# Patient Record
Sex: FEMALE | Race: WHITE | URBAN - METROPOLITAN AREA
[De-identification: names, ages, dates, MRNs, and addresses within clinical notes are randomized per-mention and may not be internally consistent; named-entity substitution may affect disease eponyms.]

---

## 2021-08-26 ENCOUNTER — TELEPHONE (OUTPATIENT)
Dept: OTHER | Age: 61
End: 2021-08-26

## 2021-08-26 NOTE — TELEPHONE ENCOUNTER
Pt name and  verified. Pt sister, Jarrod Danielle is calling as they would like to see if it would be possible to establish Pt with the practice. Pt has a lot of breathing issues currently due to living with [facility]. Will be moving up here with Jarrod Danielle and would like to become a Pt of the practice. Advised there is a wait list for the practice and they would have to reach out to discuss. Isatuyogi Shashi is looking to discuss this if possible. Was not interested in other accepting locations. Please advise.   [phone] (home)     Gavino Parisi

## 2021-08-26 NOTE — TELEPHONE ENCOUNTER
Call to pt's sister, pt is experiencing breathing issues and gasping for air due to high toxicity exposure of black mold. Would like to be a new pt with [facility], sent a NP packet and will await information back to schedule NP appt.

## 2022-04-04 PROBLEM — J10.1 INFLUENZA A: Status: ACTIVE | Noted: 2022-03-30

## 2022-04-04 PROBLEM — H65.92 LEFT NON-SUPPURATIVE OTITIS MEDIA: Status: ACTIVE | Noted: 2022-03-30

## 2022-04-04 PROBLEM — F43.10 PTSD (POST-TRAUMATIC STRESS DISORDER): Status: ACTIVE | Noted: 2022-03-30

## 2022-04-04 PROBLEM — E03.9 ACQUIRED HYPOTHYROIDISM: Status: ACTIVE | Noted: 2022-03-30

## 2022-04-04 PROBLEM — J44.1 COPD EXACERBATION (HCC): Status: ACTIVE | Noted: 2022-03-30

## 2022-04-05 PROBLEM — J44.1 COPD EXACERBATION (HCC): Chronic | Status: ACTIVE | Noted: 2022-03-30

## 2022-04-21 PROBLEM — I10 PRIMARY HYPERTENSION: Status: ACTIVE | Noted: 2022-04-21

## 2022-04-21 PROBLEM — F17.219 CIGARETTE NICOTINE DEPENDENCE WITH NICOTINE-INDUCED DISORDER: Chronic | Status: ACTIVE | Noted: 2022-04-21

## 2022-04-21 PROBLEM — I10 PRIMARY HYPERTENSION: Chronic | Status: ACTIVE | Noted: 2022-04-21

## 2022-04-21 PROBLEM — F17.219 CIGARETTE NICOTINE DEPENDENCE WITH NICOTINE-INDUCED DISORDER: Status: ACTIVE | Noted: 2022-04-21

## 2022-04-21 PROBLEM — Z77.120 CONTACT WITH AND (SUSPECTED) EXPOSURE TO MOLD (TOXIC): Status: ACTIVE | Noted: 2022-04-21

## 2022-04-21 PROBLEM — Z77.120 CONTACT WITH AND (SUSPECTED) EXPOSURE TO MOLD (TOXIC): Chronic | Status: ACTIVE | Noted: 2022-04-21
